# Patient Record
Sex: FEMALE | URBAN - METROPOLITAN AREA
[De-identification: names, ages, dates, MRNs, and addresses within clinical notes are randomized per-mention and may not be internally consistent; named-entity substitution may affect disease eponyms.]

---

## 2021-01-26 ENCOUNTER — IMMUNIZATIONS (OUTPATIENT)
Dept: FAMILY MEDICINE CLINIC | Facility: HOSPITAL | Age: 76
End: 2021-01-26

## 2021-01-26 DIAGNOSIS — Z23 ENCOUNTER FOR IMMUNIZATION: Primary | ICD-10-CM

## 2021-01-26 PROCEDURE — 0011A SARS-COV-2 / COVID-19 MRNA VACCINE (MODERNA) 100 MCG: CPT

## 2021-01-26 PROCEDURE — 91301 SARS-COV-2 / COVID-19 MRNA VACCINE (MODERNA) 100 MCG: CPT

## 2021-02-23 ENCOUNTER — IMMUNIZATIONS (OUTPATIENT)
Dept: FAMILY MEDICINE CLINIC | Facility: HOSPITAL | Age: 76
End: 2021-02-23

## 2021-02-23 DIAGNOSIS — Z23 ENCOUNTER FOR IMMUNIZATION: Primary | ICD-10-CM

## 2021-02-23 PROCEDURE — 91301 SARS-COV-2 / COVID-19 MRNA VACCINE (MODERNA) 100 MCG: CPT

## 2021-02-23 PROCEDURE — 0012A SARS-COV-2 / COVID-19 MRNA VACCINE (MODERNA) 100 MCG: CPT

## 2025-01-09 ENCOUNTER — APPOINTMENT (EMERGENCY)
Dept: RADIOLOGY | Facility: HOSPITAL | Age: 80
End: 2025-01-09

## 2025-01-09 ENCOUNTER — HOSPITAL ENCOUNTER (OUTPATIENT)
Facility: HOSPITAL | Age: 80
Setting detail: OBSERVATION
Discharge: HOME | End: 2025-01-10
Attending: EMERGENCY MEDICINE

## 2025-01-09 ENCOUNTER — APPOINTMENT (EMERGENCY)
Dept: CT IMAGING | Facility: HOSPITAL | Age: 80
End: 2025-01-09

## 2025-01-09 DIAGNOSIS — J96.01 ACUTE HYPOXEMIC RESPIRATORY FAILURE (HCC): ICD-10-CM

## 2025-01-09 DIAGNOSIS — I26.94 MULTIPLE SUBSEGMENTAL PULMONARY EMBOLI WITHOUT ACUTE COR PULMONALE (HCC): Primary | ICD-10-CM

## 2025-01-09 PROBLEM — E03.9 ACQUIRED HYPOTHYROIDISM: Status: ACTIVE | Noted: 2025-01-09

## 2025-01-09 PROBLEM — I10 PRIMARY HYPERTENSION: Status: ACTIVE | Noted: 2025-01-09

## 2025-01-09 LAB
ALBUMIN SERPL BCG-MCNC: 4.4 G/DL (ref 3.5–5)
ALP SERPL-CCNC: 70 U/L (ref 34–104)
ALT SERPL W P-5'-P-CCNC: 25 U/L (ref 7–52)
ANION GAP SERPL CALCULATED.3IONS-SCNC: 9 MMOL/L (ref 4–13)
APTT PPP: 28 SECONDS (ref 23–34)
AST SERPL W P-5'-P-CCNC: 21 U/L (ref 13–39)
ATRIAL RATE: 77 BPM
BASOPHILS # BLD AUTO: 0.06 THOUSANDS/ΜL (ref 0–0.1)
BASOPHILS NFR BLD AUTO: 1 % (ref 0–1)
BILIRUB SERPL-MCNC: 0.5 MG/DL (ref 0.2–1)
BNP SERPL-MCNC: 128 PG/ML (ref 0–100)
BUN SERPL-MCNC: 31 MG/DL (ref 5–25)
CALCIUM SERPL-MCNC: 12.8 MG/DL (ref 8.4–10.2)
CARDIAC TROPONIN I PNL SERPL HS: 4 NG/L (ref ?–50)
CHLORIDE SERPL-SCNC: 102 MMOL/L (ref 96–108)
CO2 SERPL-SCNC: 29 MMOL/L (ref 21–32)
CREAT SERPL-MCNC: 1.2 MG/DL (ref 0.6–1.3)
EOSINOPHIL # BLD AUTO: 0.08 THOUSAND/ΜL (ref 0–0.61)
EOSINOPHIL NFR BLD AUTO: 1 % (ref 0–6)
ERYTHROCYTE [DISTWIDTH] IN BLOOD BY AUTOMATED COUNT: 13.9 % (ref 11.6–15.1)
FLUAV RNA RESP QL NAA+PROBE: NEGATIVE
FLUBV RNA RESP QL NAA+PROBE: NEGATIVE
GFR SERPL CREATININE-BSD FRML MDRD: 42 ML/MIN/1.73SQ M
GLUCOSE SERPL-MCNC: 170 MG/DL (ref 65–140)
HCT VFR BLD AUTO: 42.5 % (ref 34.8–46.1)
HGB BLD-MCNC: 13.3 G/DL (ref 11.5–15.4)
IMM GRANULOCYTES # BLD AUTO: 0.22 THOUSAND/UL (ref 0–0.2)
IMM GRANULOCYTES NFR BLD AUTO: 2 % (ref 0–2)
INR PPP: 1.13 (ref 0.85–1.19)
LYMPHOCYTES # BLD AUTO: 2.45 THOUSANDS/ΜL (ref 0.6–4.47)
LYMPHOCYTES NFR BLD AUTO: 24 % (ref 14–44)
MCH RBC QN AUTO: 31.3 PG (ref 26.8–34.3)
MCHC RBC AUTO-ENTMCNC: 31.3 G/DL (ref 31.4–37.4)
MCV RBC AUTO: 100 FL (ref 82–98)
MONOCYTES # BLD AUTO: 0.6 THOUSAND/ΜL (ref 0.17–1.22)
MONOCYTES NFR BLD AUTO: 6 % (ref 4–12)
NEUTROPHILS # BLD AUTO: 6.65 THOUSANDS/ΜL (ref 1.85–7.62)
NEUTS SEG NFR BLD AUTO: 66 % (ref 43–75)
NRBC BLD AUTO-RTO: 0 /100 WBCS
P AXIS: 46 DEGREES
PLATELET # BLD AUTO: 208 THOUSANDS/UL (ref 149–390)
PMV BLD AUTO: 9.1 FL (ref 8.9–12.7)
POTASSIUM SERPL-SCNC: 3.5 MMOL/L (ref 3.5–5.3)
PR INTERVAL: 180 MS
PROCALCITONIN SERPL-MCNC: <0.05 NG/ML
PROT SERPL-MCNC: 8 G/DL (ref 6.4–8.4)
PROTHROMBIN TIME: 14.9 SECONDS (ref 12.3–15)
QRS AXIS: 13 DEGREES
QRSD INTERVAL: 100 MS
QT INTERVAL: 396 MS
QTC INTERVAL: 448 MS
RBC # BLD AUTO: 4.25 MILLION/UL (ref 3.81–5.12)
RSV RNA RESP QL NAA+PROBE: NEGATIVE
SARS-COV-2 RNA RESP QL NAA+PROBE: NEGATIVE
SODIUM SERPL-SCNC: 140 MMOL/L (ref 135–147)
T WAVE AXIS: 27 DEGREES
VENTRICULAR RATE: 77 BPM
WBC # BLD AUTO: 10.06 THOUSAND/UL (ref 4.31–10.16)

## 2025-01-09 PROCEDURE — 85730 THROMBOPLASTIN TIME PARTIAL: CPT

## 2025-01-09 PROCEDURE — 99291 CRITICAL CARE FIRST HOUR: CPT | Performed by: PHYSICIAN ASSISTANT

## 2025-01-09 PROCEDURE — 85730 THROMBOPLASTIN TIME PARTIAL: CPT | Performed by: PHYSICIAN ASSISTANT

## 2025-01-09 PROCEDURE — 71275 CT ANGIOGRAPHY CHEST: CPT

## 2025-01-09 PROCEDURE — 36415 COLL VENOUS BLD VENIPUNCTURE: CPT | Performed by: PHYSICIAN ASSISTANT

## 2025-01-09 PROCEDURE — 99285 EMERGENCY DEPT VISIT HI MDM: CPT

## 2025-01-09 PROCEDURE — 85025 COMPLETE CBC W/AUTO DIFF WBC: CPT | Performed by: PHYSICIAN ASSISTANT

## 2025-01-09 PROCEDURE — 84145 PROCALCITONIN (PCT): CPT | Performed by: PHYSICIAN ASSISTANT

## 2025-01-09 PROCEDURE — 84484 ASSAY OF TROPONIN QUANT: CPT | Performed by: PHYSICIAN ASSISTANT

## 2025-01-09 PROCEDURE — 80053 COMPREHEN METABOLIC PANEL: CPT | Performed by: PHYSICIAN ASSISTANT

## 2025-01-09 PROCEDURE — 71046 X-RAY EXAM CHEST 2 VIEWS: CPT

## 2025-01-09 PROCEDURE — 0241U HB NFCT DS VIR RESP RNA 4 TRGT: CPT | Performed by: PHYSICIAN ASSISTANT

## 2025-01-09 PROCEDURE — 99223 1ST HOSP IP/OBS HIGH 75: CPT

## 2025-01-09 PROCEDURE — 83880 ASSAY OF NATRIURETIC PEPTIDE: CPT | Performed by: PHYSICIAN ASSISTANT

## 2025-01-09 PROCEDURE — 93005 ELECTROCARDIOGRAM TRACING: CPT

## 2025-01-09 PROCEDURE — 85610 PROTHROMBIN TIME: CPT | Performed by: PHYSICIAN ASSISTANT

## 2025-01-09 RX ORDER — LEVOTHYROXINE SODIUM 75 UG/1
75 TABLET ORAL
COMMUNITY

## 2025-01-09 RX ORDER — HEPARIN SODIUM 1000 [USP'U]/ML
5600 INJECTION, SOLUTION INTRAVENOUS; SUBCUTANEOUS EVERY 6 HOURS PRN
Status: DISCONTINUED | OUTPATIENT
Start: 2025-01-09 | End: 2025-01-10

## 2025-01-09 RX ORDER — HEPARIN SODIUM 1000 [USP'U]/ML
2800 INJECTION, SOLUTION INTRAVENOUS; SUBCUTANEOUS EVERY 6 HOURS PRN
Status: DISCONTINUED | OUTPATIENT
Start: 2025-01-09 | End: 2025-01-10

## 2025-01-09 RX ORDER — IRBESARTAN AND HYDROCHLOROTHIAZIDE 150; 12.5 MG/1; MG/1
1 TABLET, FILM COATED ORAL EVERY EVENING
COMMUNITY

## 2025-01-09 RX ORDER — HEPARIN SODIUM 1000 [USP'U]/ML
5600 INJECTION, SOLUTION INTRAVENOUS; SUBCUTANEOUS ONCE
Status: COMPLETED | OUTPATIENT
Start: 2025-01-09 | End: 2025-01-09

## 2025-01-09 RX ORDER — HEPARIN SODIUM 10000 [USP'U]/100ML
3-30 INJECTION, SOLUTION INTRAVENOUS
Status: DISCONTINUED | OUTPATIENT
Start: 2025-01-09 | End: 2025-01-10

## 2025-01-09 RX ORDER — IPRATROPIUM BROMIDE AND ALBUTEROL SULFATE 2.5; .5 MG/3ML; MG/3ML
3 SOLUTION RESPIRATORY (INHALATION)
Status: DISCONTINUED | OUTPATIENT
Start: 2025-01-09 | End: 2025-01-09

## 2025-01-09 RX ADMIN — IOHEXOL 100 ML: 350 INJECTION, SOLUTION INTRAVENOUS at 15:35

## 2025-01-09 RX ADMIN — IPRATROPIUM BROMIDE AND ALBUTEROL SULFATE 3 ML: .5; 3 SOLUTION RESPIRATORY (INHALATION) at 20:03

## 2025-01-09 RX ADMIN — IPRATROPIUM BROMIDE AND ALBUTEROL SULFATE 3 ML: .5; 3 SOLUTION RESPIRATORY (INHALATION) at 14:41

## 2025-01-09 RX ADMIN — HEPARIN SODIUM 5600 UNITS: 1000 INJECTION INTRAVENOUS; SUBCUTANEOUS at 16:29

## 2025-01-09 RX ADMIN — HEPARIN SODIUM 18 UNITS/KG/HR: 10000 INJECTION, SOLUTION INTRAVENOUS at 16:31

## 2025-01-09 NOTE — ED PROVIDER NOTES
Time reflects when diagnosis was documented in both MDM as applicable and the Disposition within this note       Time User Action Codes Description Comment    1/9/2025  4:23 PM Km Dan Add [I26.94] Multiple subsegmental pulmonary emboli without acute cor pulmonale (HCC)     1/9/2025  4:23 PM Km Dan Add [J96.01] Acute hypoxemic respiratory failure (HCC)           ED Disposition       ED Disposition   Admit    Condition   Stable    Date/Time   Thu Jan 9, 2025  4:23 PM    Comment   Case was discussed with Dr. Linda and the patient's admission status was agreed to be Admission Status: observation status to the service of Dr. Linda.               Assessment & Plan       Medical Decision Making  79-year-old female presenting to the emergency department today for hypoxia.  Recently had nasal congestion, rhinorrhea, and a cough.  Patient denies any chest pain or shortness of breath but does have pain to her back when she coughs.  Vital signs show hypoxemia.  The patient is requiring 2 L nasal cannula to maintain oxygen greater than 92%.  Lungs clear to auscultation bilaterally without adventitious breath sounds.  No lower extremity swelling.  She had a negative troponin x 1.  BNP is slightly elevated.  She has no leukocytosis.  She had a negative viral panel.  Chest x-ray shows no acute cardiopulmonary disease on my independent interpretation.  I performed a CTA PE study secondary to undetermined hypoxemia; it appears she has multiple pulmonary emboli without evidence of right heart strain.  RV/LV ratio within normal limits.  The patient was initiated on a heparin infusion.  Case was discussed with Dr. Linda who accepts the patient for observation.  The patient and/or patient's proxy verify their understanding and agree to the plan at this time.  All questions answered to the patient and/or their proxy's satisfaction.  All labs reviewed and utilized in the medical decision  "making process (if labs were ordered).  Portions of the record may have been created with voice recognition software.  Occasional wrong word or \"sound a like\" substitutions may have occurred due to the inherent limitations of voice recognition software.  Read the chart carefully and recognize, using context, where substitutions have occurred.    Case discussed with daughter at bedside.    Problems Addressed:  Acute hypoxemic respiratory failure (HCC): undiagnosed new problem with uncertain prognosis  Multiple subsegmental pulmonary emboli without acute cor pulmonale (HCC): undiagnosed new problem with uncertain prognosis    Amount and/or Complexity of Data Reviewed  Independent Historian: caregiver     Details: Daughter  Labs: ordered. Decision-making details documented in ED Course.  Radiology: ordered and independent interpretation performed. Decision-making details documented in ED Course.     Details: CXR  ECG/medicine tests: ordered and independent interpretation performed. Decision-making details documented in ED Course.  Discussion of management or test interpretation with external provider(s): Dr. Alexei Dalton  Prescription drug management.  Decision regarding hospitalization.             Medications   ipratropium-albuterol (DUO-NEB) 0.5-2.5 mg/3 mL inhalation solution 3 mL (3 mL Nebulization Given 1/9/25 1441)   heparin (porcine) 25,000 units in 0.45% NaCl 250 mL infusion (premix) (18 Units/kg/hr × 70 kg (Order-Specific) Intravenous New Bag 1/9/25 1631)   heparin (porcine) injection 5,600 Units (has no administration in time range)   heparin (porcine) injection 2,800 Units (has no administration in time range)   iohexol (OMNIPAQUE) 350 MG/ML injection (SINGLE-DOSE) 100 mL (100 mL Intravenous Given 1/9/25 1535)   heparin (porcine) injection 5,600 Units (5,600 Units Intravenous Given 1/9/25 1629)       ED Risk Strat Scores                          SBIRT 20yo+      Flowsheet Row Most Recent Value "   Initial Alcohol Screen: US AUDIT-C     1. How often do you have a drink containing alcohol? 0 Filed at: 01/09/2025 1313   2. How many drinks containing alcohol do you have on a typical day you are drinking?  0 Filed at: 01/09/2025 1313   3a. Male UNDER 65: How often do you have five or more drinks on one occasion? 0 Filed at: 01/09/2025 1313   3b. FEMALE Any Age, or MALE 65+: How often do you have 4 or more drinks on one occassion? 0 Filed at: 01/09/2025 1313   Audit-C Score 0 Filed at: 01/09/2025 1313   ZENAIDA: How many times in the past year have you...    Used an illegal drug or used a prescription medication for non-medical reasons? Never Filed at: 01/09/2025 1313                            History of Present Illness       Chief Complaint   Patient presents with    Decreased Oxygen Level     Pt sent in by primary care, decreased O2 levels in office 88-89%. Flu like symptoms over the past five days. 88% in triage, endorses back pain, denies chest pain       Past Medical History:   Diagnosis Date    Disease of thyroid gland     Hypertension       History reviewed. No pertinent surgical history.   History reviewed. No pertinent family history.   Social History     Tobacco Use    Smoking status: Never    Smokeless tobacco: Never      E-Cigarette/Vaping      E-Cigarette/Vaping Substances      I have reviewed and agree with the history as documented.     This is an 79-year-old female with past medical history significant for hypertension presenting to the emergency department today for a cough associated with nasal congestion and rhinorrhea for approximately 5 days.  The patient went to her PCP earlier today who noted that she has low oxygen levels and sent her to the emergency department for an evaluation.  The patient denies any associated chest pain or shortness of breath but she does note chest wall pain specifically when she coughs.  She denies any history of pulmonary emboli or venous thromboemboli.  She denies  any fevers or chills.  She has no lower extremity swelling or calf pain.  She has no nausea or vomiting.  She has no diarrhea or constipation.  She denies any dizziness, lightheadedness, or visual disturbances.  The patient's cough is nonproductive.  The patient denies other complaints at this time.      History provided by:  Patient   used: No    Cough  Cough characteristics:  Non-productive  Severity:  Moderate  Onset quality:  Gradual  Duration:  5 days  Timing:  Intermittent  Progression:  Waxing and waning  Chronicity:  New  Relieved by:  None tried  Worsened by:  Nothing  Ineffective treatments:  None tried  Associated symptoms: rhinorrhea and sinus congestion    Associated symptoms: no chest pain, no chills, no diaphoresis, no ear fullness, no ear pain, no eye discharge, no fever, no headaches, no myalgias, no rash, no shortness of breath, no sore throat, no weight loss and no wheezing        Review of Systems   Constitutional:  Negative for appetite change, chills, diaphoresis, fatigue, fever and weight loss.   HENT:  Positive for congestion and rhinorrhea. Negative for ear pain, sinus pressure, sinus pain and sore throat.    Eyes:  Negative for discharge.   Respiratory:  Positive for cough. Negative for chest tightness, shortness of breath and wheezing.    Cardiovascular:  Negative for chest pain, palpitations and leg swelling.   Gastrointestinal:  Negative for abdominal pain, constipation, diarrhea, nausea and vomiting.   Musculoskeletal:  Negative for myalgias, neck pain and neck stiffness.   Skin:  Negative for rash and wound.   Neurological:  Negative for dizziness, seizures, syncope, weakness, light-headedness, numbness and headaches.   Psychiatric/Behavioral:  Negative for confusion.    All other systems reviewed and are negative.          Objective       ED Triage Vitals [01/09/25 1311]   Temperature Pulse Blood Pressure Respirations SpO2 Patient Position - Orthostatic VS   98.4  °F (36.9 °C) 87 130/60 20 (S) (!) 88 % Sitting      Temp Source Heart Rate Source BP Location FiO2 (%) Pain Score    Oral Monitor Left arm -- --      Vitals      Date and Time Temp Pulse SpO2 Resp BP Pain Score FACES Pain Rating User   01/09/25 1815 -- 68 94 % 18 -- -- -- DU   01/09/25 1730 -- 70 91 % 21 -- -- -- DU   01/09/25 1500 -- 69 99 % 18 -- -- -- DU   01/09/25 1315 -- 83 90 % 19 130/60 -- -- DU   01/09/25 1311 98.4 °F (36.9 °C) 87  88 % 20 130/60 -- -- CB            Physical Exam  Vitals and nursing note reviewed.   Constitutional:       General: She is not in acute distress.     Appearance: Normal appearance. She is normal weight. She is not ill-appearing, toxic-appearing or diaphoretic.   HENT:      Head: Normocephalic and atraumatic.      Nose: Nose normal. No congestion or rhinorrhea.      Mouth/Throat:      Mouth: Mucous membranes are moist.      Pharynx: No oropharyngeal exudate or posterior oropharyngeal erythema.   Eyes:      General: No scleral icterus.        Right eye: No discharge.         Left eye: No discharge.      Extraocular Movements: Extraocular movements intact.      Pupils: Pupils are equal, round, and reactive to light.   Cardiovascular:      Rate and Rhythm: Normal rate and regular rhythm.      Pulses: Normal pulses.      Heart sounds: Normal heart sounds. No murmur heard.     No friction rub. No gallop.   Pulmonary:      Effort: Pulmonary effort is normal. No respiratory distress.      Breath sounds: Normal breath sounds. No stridor. No wheezing, rhonchi or rales.      Comments: Lungs are clear to auscultation bilaterally without adventitious breath sounds.  Patient is saturating between 86 and 88% on room air.  Placed on 2 L nasal cannula and is now saturating greater than 92%.  Chest:      Chest wall: No tenderness.   Abdominal:      General: Abdomen is flat. There is no distension.      Palpations: Abdomen is soft.      Tenderness: There is no abdominal tenderness. There is no  right CVA tenderness, left CVA tenderness, guarding or rebound.   Musculoskeletal:         General: Normal range of motion.      Cervical back: Normal range of motion. No tenderness.      Right lower leg: No edema.      Left lower leg: No edema.      Comments: Negative Homans' sign bilaterally   Skin:     General: Skin is warm and dry.      Capillary Refill: Capillary refill takes less than 2 seconds.      Coloration: Skin is not jaundiced or pale.   Neurological:      General: No focal deficit present.      Mental Status: She is alert and oriented to person, place, and time. Mental status is at baseline.   Psychiatric:         Mood and Affect: Mood normal.         Behavior: Behavior normal.         Results Reviewed       Procedure Component Value Units Date/Time    Protime-INR [[id number]]  (Normal) Collected: 01/09/25 1605    Lab Status: Final result Specimen: Blood from Arm, Left Updated: 01/09/25 1617     Protime 14.9 seconds      INR 1.13    Narrative:      INR Therapeutic Range    Indication                                             INR Range      Atrial Fibrillation                                               2.0-3.0  Hypercoagulable State                                    2.0.2.3  Left Ventricular Asist Device                            2.0-3.0  Mechanical Heart Valve                                  -    Aortic(with afib, MI, embolism, HF, LA enlargement,    and/or coagulopathy)                                     2.0-3.0 (2.5-3.5)     Mitral                                                             2.5-3.5  Prosthetic/Bioprosthetic Heart Valve               2.0-3.0  Venous thromboembolism (VTE: VT, PE        2.0-3.0    APTT [[id number]]  (Normal) Collected: 01/09/25 1605    Lab Status: Final result Specimen: Blood from Arm, Left Updated: 01/09/25 1617     PTT 28 seconds     FLU/RSV/COVID - if FLU/RSV clinically relevant (2hr TAT) [[id number]]  (Normal) Collected: 01/09/25 1322    Lab Status: Final  result Specimen: Nares from Nose Updated: 01/09/25 1421     SARS-CoV-2 Negative     INFLUENZA A PCR Negative     INFLUENZA B PCR Negative     RSV PCR Negative    Narrative:      This test has been performed using the CoV-2/Flu/RSV plus assay on the "[employer] platform. This test has been validated by the  and verified by the performing laboratory.     This test is designed to amplify and detect the following: nucleocapsid (N), envelope (E), and RNA-dependent RNA polymerase (RdRP) genes of the SARS-CoV-2 genome; matrix (M), basic polymerase (PB2), and acidic protein (PA) segments of the influenza A genome; matrix (M) and non-structural protein (NS) segments of the influenza B genome, and the nucleocapsid genes of RSV A and RSV B.     Positive results are indicative of the presence of Flu A, Flu B, RSV, and/or SARS-CoV-2 RNA. Positive results for SARS-CoV-2 or suspected novel influenza should be reported to state, local, or federal health departments according to local reporting requirements.      All results should be assessed in conjunction with clinical presentation and other laboratory markers for clinical management.     FOR PEDIATRIC PATIENTS - copy/paste COVID Guidelines URL to browser: [email or web address]       Comprehensive metabolic panel [[id number]]  (Abnormal) Collected: 01/09/25 1322    Lab Status: Final result Specimen: Blood from Arm, Left Updated: 01/09/25 1407     Sodium 140 mmol/L      Potassium 3.5 mmol/L      Chloride 102 mmol/L      CO2 29 mmol/L      ANION GAP 9 mmol/L      BUN 31 mg/dL      Creatinine 1.20 mg/dL      Glucose 170 mg/dL      Calcium 12.8 mg/dL      AST 21 U/L      ALT 25 U/L      Alkaline Phosphatase 70 U/L      Total Protein 8.0 g/dL      Albumin 4.4 g/dL      Total Bilirubin 0.50 mg/dL      eGFR 42 ml/min/1.73sq m     Narrative:      National Kid[employer] Foundation guidelines for Chronic Kidney Disease  (CKD):     Stage 1 with normal or high GFR (GFR > 90 mL/min/1.73 square meters)    Stage 2 Mild CKD (GFR = 60-89 mL/min/1.73 square meters)    Stage 3A Moderate CKD (GFR = 45-59 mL/min/1.73 square meters)    Stage 3B Moderate CKD (GFR = 30-44 mL/min/1.73 square meters)    Stage 4 Severe CKD (GFR = 15-29 mL/min/1.73 square meters)    Stage 5 End Stage CKD (GFR <15 mL/min/1.73 square meters)  Note: GFR calculation is accurate only with a steady state creatinine    Procalcitonin [[id number]]  (Normal) Collected: 01/09/25 1322    Lab Status: Final result Specimen: Blood from Arm, Left Updated: 01/09/25 1356     Procalcitonin <0.05 ng/ml     HS Troponin 0hr (reflex protocol) [[id number]]  (Normal) Collected: 01/09/25 1322    Lab Status: Final result Specimen: Blood from Arm, Left Updated: 01/09/25 1351     hs TnI 0hr 4 ng/L     B-Type Natriuretic Peptide(BNP) [[id number]]  (Abnormal) Collected: 01/09/25 1322    Lab Status: Final result Specimen: Blood from Arm, Left Updated: 01/09/25 1351      pg/mL     CBC and differential [[id number]]  (Abnormal) Collected: 01/09/25 1322    Lab Status: Final result Specimen: Blood from Arm, Left Updated: 01/09/25 1329     WBC 10.06 Thousand/uL      RBC 4.25 Million/uL      Hemoglobin 13.3 g/dL      Hematocrit 42.5 %       fL      MCH 31.3 pg      MCHC 31.3 g/dL      RDW 13.9 %      MPV 9.1 fL      Platelets 208 Thousands/uL      nRBC 0 /100 WBCs      Segmented % 66 %      Immature Grans % 2 %      Lymphocytes % 24 %      Monocytes % 6 %      Eosinophils Relative 1 %      Basophils Relative 1 %      Absolute Neutrophils 6.65 Thousands/µL      Absolute Immature Grans 0.22 Thousand/uL      Absolute Lymphocytes 2.45 Thousands/µL      Absolute Monocytes 0.60 Thousand/µL      Eosinophils Absolute 0.08 Thousand/µL      Basophils Absolute 0.06 Thousands/µL             CTA chest pe study   Final Interpretation by Max Martínez MD (01/09 1553)      Images degraded by respiratory motion  artifact.      Acute pulmonary emboli involving predominantly the right middle and right lower lobe pulmonary artery branches, with lesser involvement also likely present of right upper lobe pulmonary artery branches. No saddle embolus is identified. No definite left    pulmonary emboli are identified.      Measured RV/LV ratio is within normal limits at less than 0.9.      Ventricles are normal in size. Likely concentric left ventricular hypertrophy. Mild left atrial enlargement.      Bibasilar lower lobe atelectasis. No consolidation pleural effusion or pneumothorax identified.         I personally discussed this study with KM GARCIA on 1/9/2025 3:49 PM.            Workstation performed: [id number]         XR chest 2 views   Final Interpretation by Andrew Bellamy MD (01/09 1453)      Hypoventilation with prominence of the interstitial markings and atelectasis at the bases. Small infiltrates are less likely.      Some minimal pulmonary vascular congestion may be present.      Follow-up may be useful.            Resident: Nico Quevedo I, the attending radiologist, have reviewed the images and agree with the final report above.      Workstation performed: [id number]             ECG 12 Lead Documentation Only    Date/Time: 1/9/2025 1:23 PM    Performed by: Km Garcia PA-C  Authorized by: Km Garcia PA-C    Indications / Diagnosis:  Hypoxia  Patient location:  ED  Previous ECG:     Previous ECG:  Unavailable  Interpretation:     Interpretation: normal    Rate:     ECG rate:  77    ECG rate assessment: normal    Rhythm:     Rhythm: sinus rhythm    Ectopy:     Ectopy: none    QRS:     QRS axis:  Normal  Conduction:     Conduction: normal    ST segments:     ST segments:  Normal  T waves:     T waves: normal    Comments:      Normal sinus rhythm with a rate of 77.  Normal axis.  No ectopy.  No STEMI.  CriticalCare Time    Date/Time: 1/9/2025 5:00  PM    Performed by: Km Dan PA-C  Authorized by: Km Dan PA-C    Critical care provider statement:     Critical care time (minutes):  32    Critical care time was exclusive of:  Separately billable procedures and treating other patients    Critical care was necessary to treat or prevent imminent or life-threatening deterioration of the following conditions:  Respiratory failure    Critical care was time spent personally by me on the following activities:  Blood draw for specimens, obtaining history from patient or surrogate, development of treatment plan with patient or surrogate, examination of patient, evaluation of patient's response to treatment, ordering and performing treatments and interventions, ordering and review of laboratory studies, ordering and review of radiographic studies, re-evaluation of patient's condition and review of old charts  Comments:      Multiple Pulmonary Emboli + IV Heparin GTT      ED Medication and Procedure Management   Prior to Admission Medications   Prescriptions Last Dose Informant Patient Reported? Taking?   irbesartan-hydrochlorothiazide (AVALIDE) 150-12.5 MG per tablet   Yes Yes   Sig: Take 1 tablet by mouth every evening   levothyroxine 75 mcg tablet   Yes Yes   Sig: Take 75 mcg by mouth daily in the early morning      Facility-Administered Medications: None     Current Discharge Medication List        START taking these medications    Details   apixaban (ELIQUIS) 5 mg Take 2 tablets (10 mg total) by mouth 2 (two) times a day for 7 days, THEN 1 tablet (5 mg total) 2 (two) times a day for 23 days.  Qty: 74 tablet, Refills: 0    Comments: Eliquis Starter Pack  Associated Diagnoses: Multiple subsegmental pulmonary emboli without acute cor pulmonale (HCC)           CONTINUE these medications which have NOT CHANGED    Details   irbesartan-hydrochlorothiazide (AVALIDE) 150-12.5 MG per tablet Take 1 tablet by mouth every evening       levothyroxine 75 mcg tablet Take 75 mcg by mouth daily in the early morning           No discharge procedures on file.  ED SEPSIS DOCUMENTATION   Time reflects when diagnosis was documented in both MDM as applicable and the Disposition within this note       Time User Action Codes Description Comment    1/9/2025  4:23 PM Km Dan Add [I26.94] Multiple subsegmental pulmonary emboli without acute cor pulmonale (HCC)     1/9/2025  4:23 PM Km Dan Add [J96.01] Acute hypoxemic respiratory failure (HCC)                  Km Dan PA-C  01/09/25 0716

## 2025-01-09 NOTE — H&P
H&P - Hospitalist   Name: Donna Amado 79 y.o. female I MRN: [id number]  Unit/Bed#: ED 05 I Date of Admission: 1/9/2025   Date of Service: 1/9/2025 I Hospital Day: 0     Assessment & Plan  Multiple subsegmental pulmonary emboli without acute cor pulmonale (HCC)  R lung pulmonary emboli found on CT PE study due to hypoxia, cough  Has a sedentary lifestyle  Has had a DVT in her LE several years ago, but not currently on any AC  Hemodynamically stable at this time.    Plan:  Continue heparin gtt for now  Eliquis has been sent for vizcaino check  Will likely be able to d/c home tomorrow as long as able to wean off oxygen    Acquired hypothyroidism  Continue home euthyrox 75mcg  Primary hypertension  Continue irbesartan/HCTZ combo pill  Acute hypoxic respiratory failure (HCC)  Was requiring 3L O2 on admission due to hypoxia to 86%.  2/2 to PE  Did have recent viral illness about a week ago    Will try to wean as able. During my visit was saturating well on 1.5L      VTE Pharmacologic Prophylaxis: VTE Score: 8 High Risk (Score >/= 5) - Pharmacological DVT Prophylaxis Ordered: heparin drip. Sequential Compression Devices Ordered.  Code Status: Full Code  Discussion with family: Updated  (daughter and son in law) at bedside.    Anticipated Length of Stay: Patient will be admitted on an observation basis with an anticipated length of stay of less than 2 midnights secondary to PE.    History of Present Illness   Chief Complaint: Cruz Amado is a 79 y.o. female with a PMH of DVT, htn, hypothyroidism who presents with cough and pleuritic back pain.   Per the daughter at bedside who aids with the history as pt does not speak english says patient had an URI a week ago, but her cough has persisted. It is associated with pleuritic back pain. She does not have any phlegm, fevers, chills. No SOB.   Patient was hypoxic in the ED, prompting further imaging, that showed pulmonary emboli in the right lung.      Review of Systems   Constitutional:  Negative for chills, fatigue and fever.   HENT:  Negative for ear pain and sore throat.    Eyes:  Negative for pain and visual disturbance.   Respiratory:  Positive for cough. Negative for shortness of breath.    Cardiovascular:  Negative for chest pain and palpitations.   Gastrointestinal:  Negative for abdominal pain and vomiting.   Musculoskeletal:  Positive for back pain. Negative for arthralgias.   Skin:  Negative for color change and rash.   All other systems reviewed and are negative.      Historical Information   Past Medical History:   Diagnosis Date    Disease of thyroid gland     Hypertension      History reviewed. No pertinent surgical history.  Social History     Tobacco Use    Smoking status: Never    Smokeless tobacco: Never   Substance and Sexual Activity    Alcohol use: Not on file    Drug use: Not on file    Sexual activity: Not on file     E-Cigarette/Vaping     E-Cigarette/Vaping Substances     History reviewed. No pertinent family history.  Social History:  Marital Status: Unknown   Patient Pre-hospital Living Situation: Home, With other family member: daughter/son in law  Patient Pre-hospital Level of Mobility: walks  Patient Pre-hospital Diet Restrictions: none    Meds/Allergies   I have reviewed home medications with patient family member.  Prior to Admission medications    Medication Sig Start Date End Date Taking? Authorizing Provider   apixaban (ELIQUIS) 5 mg Take 2 tablets (10 mg total) by mouth 2 (two) times a day for 7 days, THEN 1 tablet (5 mg total) 2 (two) times a day for 23 days. 1/9/25 2/8/25 Yes Danielle Linda,    irbesartan-hydrochlorothiazide (AVALIDE) 150-12.5 MG per tablet Take 1 tablet by mouth every evening   Yes Historical Provider, MD   levothyroxine 75 mcg tablet Take 75 mcg by mouth daily in the early morning   Yes Historical Provider, MD     No Known Allergies    Objective :  Temp:  [98.4 °F (36.9 °C)] 98.4 °F (36.9  "°C)  HR:  [69-87] 70  BP: (130)/(60) 130/60  Resp:  [18-21] 21  SpO2:  [88 %-99 %] 91 %  O2 Device: None (Room air)  Nasal Cannula O2 Flow Rate (L/min):  [2 L/min] 2 L/min    Physical Exam  Vitals and nursing note reviewed.   Constitutional:       General: She is not in acute distress.     Appearance: She is well-developed.   HENT:      Head: Normocephalic and atraumatic.   Eyes:      Conjunctiva/sclera: Conjunctivae normal.   Cardiovascular:      Rate and Rhythm: Normal rate and regular rhythm.      Heart sounds: No murmur heard.  Pulmonary:      Effort: Pulmonary effort is normal. No respiratory distress.      Breath sounds: Normal breath sounds. No wheezing.   Abdominal:      Palpations: Abdomen is soft.      Tenderness: There is no abdominal tenderness.   Musculoskeletal:         General: No swelling.      Cervical back: Neck supple.      Right lower leg: No edema.      Left lower leg: No edema.   Skin:     General: Skin is warm and dry.   Neurological:      Mental Status: She is alert and oriented to person, place, and time.   Psychiatric:         Mood and Affect: Mood normal.          Lines/Drains:            Lab Results: I have reviewed the following results:  Results from last 7 days   Lab Units 01/09/25  1322   WBC Thousand/uL 10.06   HEMOGLOBIN g/dL 13.3   HEMATOCRIT % 42.5   PLATELETS Thousands/uL 208   SEGS PCT % 66   LYMPHO PCT % 24   MONO PCT % 6   EOS PCT % 1     Results from last 7 days   Lab Units 01/09/25  1322   SODIUM mmol/L 140   POTASSIUM mmol/L 3.5   CHLORIDE mmol/L 102   CO2 mmol/L 29   BUN mg/dL 31*   CREATININE mg/dL 1.20   ANION GAP mmol/L 9   CALCIUM mg/dL 12.8*   ALBUMIN g/dL 4.4   TOTAL BILIRUBIN mg/dL 0.50   ALK PHOS U/L 70   ALT U/L 25   AST U/L 21   GLUCOSE RANDOM mg/dL 170*     Results from last 7 days   Lab Units 01/09/25  1605   INR  1.13         No results found for: \"HGBA1C\"  Results from last 7 days   Lab Units 01/09/25  1322   PROCALCITONIN ng/ml <0.05       Imaging Results " Review: I reviewed radiology reports from this admission including: CT chest.  Other Study Results Review: EKG was reviewed.     Administrative Statements       ** Please Note: This note has been constructed using a voice recognition system. **

## 2025-01-09 NOTE — Clinical Note
Case was discussed with Dr. Linda and the patient's admission status was agreed to be Admission Status: observation status to the service of Dr. Linda.

## 2025-01-09 NOTE — ED NOTES
Patient spO2 high 80's on room air, patient placed on 2L NC, provider aware.      Wanda Batista RN  01/09/25 [id number]

## 2025-01-10 VITALS
OXYGEN SATURATION: 93 % | HEIGHT: 57 IN | HEART RATE: 58 BPM | TEMPERATURE: 98.4 F | DIASTOLIC BLOOD PRESSURE: 72 MMHG | RESPIRATION RATE: 15 BRPM | BODY MASS INDEX: 34.44 KG/M2 | SYSTOLIC BLOOD PRESSURE: 168 MMHG | WEIGHT: 159.61 LBS

## 2025-01-10 LAB
ANION GAP SERPL CALCULATED.3IONS-SCNC: 6 MMOL/L (ref 4–13)
APTT PPP: 173 SECONDS (ref 23–34)
APTT PPP: >210 SECONDS (ref 23–34)
BUN SERPL-MCNC: 28 MG/DL (ref 5–25)
CALCIUM SERPL-MCNC: 11.8 MG/DL (ref 8.4–10.2)
CHLORIDE SERPL-SCNC: 107 MMOL/L (ref 96–108)
CO2 SERPL-SCNC: 27 MMOL/L (ref 21–32)
CREAT SERPL-MCNC: 1.06 MG/DL (ref 0.6–1.3)
DME PARACHUTE DELIVERY DATE ACTUAL: NORMAL
DME PARACHUTE DELIVERY DATE EXPECTED: NORMAL
DME PARACHUTE DELIVERY DATE REQUESTED: NORMAL
DME PARACHUTE ITEM DESCRIPTION: NORMAL
DME PARACHUTE ORDER STATUS: NORMAL
DME PARACHUTE SUPPLIER NAME: NORMAL
DME PARACHUTE SUPPLIER PHONE: NORMAL
ERYTHROCYTE [DISTWIDTH] IN BLOOD BY AUTOMATED COUNT: 14.2 % (ref 11.6–15.1)
GFR SERPL CREATININE-BSD FRML MDRD: 49 ML/MIN/1.73SQ M
GLUCOSE SERPL-MCNC: 100 MG/DL (ref 65–140)
HCT VFR BLD AUTO: 34.9 % (ref 34.8–46.1)
HGB BLD-MCNC: 11.2 G/DL (ref 11.5–15.4)
MAGNESIUM SERPL-MCNC: 1.6 MG/DL (ref 1.9–2.7)
MCH RBC QN AUTO: 31.8 PG (ref 26.8–34.3)
MCHC RBC AUTO-ENTMCNC: 32.1 G/DL (ref 31.4–37.4)
MCV RBC AUTO: 99 FL (ref 82–98)
PLATELET # BLD AUTO: 195 THOUSANDS/UL (ref 149–390)
PMV BLD AUTO: 8.9 FL (ref 8.9–12.7)
POTASSIUM SERPL-SCNC: 3.7 MMOL/L (ref 3.5–5.3)
RBC # BLD AUTO: 3.52 MILLION/UL (ref 3.81–5.12)
SODIUM SERPL-SCNC: 140 MMOL/L (ref 135–147)
WBC # BLD AUTO: 8.75 THOUSAND/UL (ref 4.31–10.16)

## 2025-01-10 PROCEDURE — 83735 ASSAY OF MAGNESIUM: CPT

## 2025-01-10 PROCEDURE — 36415 COLL VENOUS BLD VENIPUNCTURE: CPT

## 2025-01-10 PROCEDURE — 99239 HOSP IP/OBS DSCHRG MGMT >30: CPT

## 2025-01-10 PROCEDURE — 85730 THROMBOPLASTIN TIME PARTIAL: CPT

## 2025-01-10 PROCEDURE — 94761 N-INVAS EAR/PLS OXIMETRY MLT: CPT

## 2025-01-10 PROCEDURE — 85027 COMPLETE CBC AUTOMATED: CPT

## 2025-01-10 PROCEDURE — 80048 BASIC METABOLIC PNL TOTAL CA: CPT

## 2025-01-10 RX ORDER — LOSARTAN POTASSIUM 50 MG/1
50 TABLET ORAL DAILY
Status: DISCONTINUED | OUTPATIENT
Start: 2025-01-10 | End: 2025-01-10 | Stop reason: HOSPADM

## 2025-01-10 RX ORDER — LEVOTHYROXINE SODIUM 25 UG/1
75 TABLET ORAL
Status: DISCONTINUED | OUTPATIENT
Start: 2025-01-10 | End: 2025-01-10 | Stop reason: HOSPADM

## 2025-01-10 RX ORDER — ENOXAPARIN SODIUM 100 MG/ML
60 INJECTION SUBCUTANEOUS EVERY 12 HOURS
Qty: 36 ML | Refills: 1 | Status: SHIPPED | OUTPATIENT
Start: 2025-01-10

## 2025-01-10 RX ORDER — MAGNESIUM SULFATE HEPTAHYDRATE 40 MG/ML
2 INJECTION, SOLUTION INTRAVENOUS ONCE
Status: COMPLETED | OUTPATIENT
Start: 2025-01-10 | End: 2025-01-10

## 2025-01-10 RX ORDER — ACETAMINOPHEN 325 MG/1
650 TABLET ORAL EVERY 6 HOURS PRN
Status: DISCONTINUED | OUTPATIENT
Start: 2025-01-10 | End: 2025-01-10 | Stop reason: HOSPADM

## 2025-01-10 RX ORDER — BENZONATATE 100 MG/1
200 CAPSULE ORAL ONCE
Status: COMPLETED | OUTPATIENT
Start: 2025-01-10 | End: 2025-01-10

## 2025-01-10 RX ORDER — HYDROCHLOROTHIAZIDE 12.5 MG/1
12.5 TABLET ORAL DAILY
Status: DISCONTINUED | OUTPATIENT
Start: 2025-01-10 | End: 2025-01-10 | Stop reason: HOSPADM

## 2025-01-10 RX ADMIN — HYDROCHLOROTHIAZIDE 12.5 MG: 12.5 TABLET ORAL at 08:41

## 2025-01-10 RX ADMIN — APIXABAN 10 MG: 5 TABLET, FILM COATED ORAL at 12:47

## 2025-01-10 RX ADMIN — ACETAMINOPHEN 650 MG: 325 TABLET, FILM COATED ORAL at 12:47

## 2025-01-10 RX ADMIN — LEVOTHYROXINE SODIUM 75 MCG: 25 TABLET ORAL at 05:56

## 2025-01-10 RX ADMIN — LOSARTAN POTASSIUM 50 MG: 50 TABLET, FILM COATED ORAL at 08:41

## 2025-01-10 RX ADMIN — BENZONATATE 200 MG: 100 CAPSULE ORAL at 12:47

## 2025-01-10 RX ADMIN — MAGNESIUM SULFATE HEPTAHYDRATE 2 G: 40 INJECTION, SOLUTION INTRAVENOUS at 08:02

## 2025-01-10 NOTE — ED NOTES
Report received from previous shift. Pt is resting, the family is at the bedside.     Katelyn Castorena RN  01/09/25 0320

## 2025-01-10 NOTE — ASSESSMENT & PLAN NOTE
Was requiring 3L O2 on admission due to hypoxia to 86%.  2/2 to PE  Did have recent viral illness about a week ago  Per RT eval, 1 L at rest, 2.5 with exertion

## 2025-01-10 NOTE — ED NOTES
Patient ambulated to restroom with family at this time, steady gait noted.      Wanda Batista RN  01/10/25 1011

## 2025-01-10 NOTE — CASE MANAGEMENT
Case Management Discharge Planning Note    Patient name Donna Amado  Location ED 05/ED 05 MRN [id number]  : 1945 Date 1/10/2025       Current Admission Date: 2025  Current Admission Diagnosis:Multiple subsegmental pulmonary emboli without acute cor pulmonale (HCC)   Patient Active Problem List    Diagnosis Date Noted Date Diagnosed    Multiple subsegmental pulmonary emboli without acute cor pulmonale (HCC) 2025     Acquired hypothyroidism 2025     Primary hypertension 2025     Acute hypoxic respiratory failure (HCC) 2025       LOS (days): 0  Geometric Mean LOS (GMLOS) (days):   Days to GMLOS:     OBJECTIVE:     Current admission status: Observation   Preferred Pharmacy:   [id number]  [place] - [address]  [address] [place] [address]  Phone: [phone] Fax: [phone]    Primary Care Provider: No primary care provider on file.    Primary Insurance:   Secondary Insurance:     DISCHARGE DETAILS:    DME Referral Provided  Referral made for DME?: Yes  DME referral completed for the following items:: Home Oxygen concentrator  DME Supplier Name:: [facility]    Treatment Team Recommendation: Home  Discharge Destination Plan:: Home  Transport at Discharge : Family    Per  leadership, hospital able to cover 30 days supply for O2 as patient is currently uninsured. O2 ordered through [employer], financial assistance form provided to company.  delivered O2 tank bedside to the patient/her daughter. Daughter is aware of 30 days coverage. Daughter reports speaking with [employer] already and reports being active in getting the patient insured. Referral to be made to [facility] for PCP.

## 2025-01-10 NOTE — UTILIZATION REVIEW
Initial Clinical Review    Admission: Date/Time/Statement:   Admission Orders (From admission, onward)       Ordered        01/09/25 1623  Place in Observation  Once                          Orders Placed This Encounter   Procedures    Place in Observation     Standing Status:   Standing     Number of Occurrences:   1     Level of Care:   Med Surg [16]     ED Arrival Information       Expected   -    Arrival   1/9/2025 13:00    Acuity   Urgent              Means of arrival   Walk-In    Escorted by   Family Member    Service   Hospitalist    Admission type   Emergency              Arrival complaint   cough, low oxygen, sent by              Chief Complaint   Patient presents with    Decreased Oxygen Level     Pt sent in by primary care, decreased O2 levels in office 88-89%. Flu like symptoms over the past five days. 88% in triage, endorses back pain, denies chest pain       Initial Presentation: 79 y.o. female who presented w/ family to [facility] ED. Admitted as Observation for evaluation and treatment of Multiple subsegmental PE. Acute hypoxic respiratory failure.      PMHx:  has a past medical history of Disease of thyroid gland and Hypertension.      Presented w/ cough and pleuritic back pain. Pt sent in by PCP, decrease O2 in office 88-89%, flu like symptoms past 5 days.  In ED, pt found to be hypoxic 86% -88% RA, placed on 2L NC @ 92%. Negative susie's sign bilaterally.  Abnormal Labs Bun 31, Ca 12.8, . CTA: Acute pulmonary emboli involving predominantly the right middle and right lower lobe pulmonary artery branches, with lesser involvement also likely present of right upper lobe pulmonary artery branches. No saddle embolus is identified. See below med list for meds given in ED.    Plan: Continue Heparin gtt, Price check Eliquis, Wean O2 as tolerated.       Date: 1/10/25   Day 2: DISCHARGE HOME ON THIS DATE.      ED Treatment-Medication Administration from 01/09/2025 1300 to 01/10/2025 1101          Date/Time Order Dose Route Action     01/09/2025 1441 ipratropium-albuterol (DUO-NEB) 0.5-2.5 mg/3 mL inhalation solution 3 mL 3 mL Nebulization Given     01/09/2025 2003 ipratropium-albuterol (DUO-NEB) 0.5-2.5 mg/3 mL inhalation solution 3 mL 3 mL Nebulization Given     01/09/2025 1535 iohexol (OMNIPAQUE) 350 MG/ML injection (SINGLE-DOSE) 100 mL 100 mL Intravenous Given     01/09/2025 1629 heparin (porcine) injection 5,600 Units 5,600 Units Intravenous Given     01/09/2025 1631 heparin (porcine) 25,000 units in 0.45% NaCl 250 mL infusion (premix) 18 Units/kg/hr Intravenous New Bag     01/10/2025 0147 heparin (porcine) 25,000 units in 0.45% NaCl 250 mL infusion (premix) 15 Units/kg/hr Intravenous Rate/Dose Change     01/10/2025 0934 heparin (porcine) 25,000 units in 0.45% NaCl 250 mL infusion (premix) 12 Units/kg/hr Intravenous Rate/Dose Change     01/10/2025 0841 losartan (COZAAR) tablet 50 mg 50 mg Oral Given     01/10/2025 0841 hydroCHLOROthiazide tablet 12.5 mg 12.5 mg Oral Given     01/10/2025 0556 levothyroxine tablet 75 mcg 75 mcg Oral Given     01/10/2025 0802 magnesium sulfate 2 g/50 mL IVPB (premix) 2 g 2 g Intravenous New Bag            Scheduled Medications:  losartan, 50 mg, Oral, Daily   And  hydroCHLOROthiazide, 12.5 mg, Oral, Daily  levothyroxine, 75 mcg, Oral, Early Morning      Continuous IV Infusions:  heparin (porcine), 3-30 Units/kg/hr (Order-Specific), Intravenous, Titrated      PRN Meds:  acetaminophen, 650 mg, Oral, Q6H PRN  heparin (porcine), 2,800 Units, Intravenous, Q6H PRN  heparin (porcine), 5,600 Units, Intravenous, Q6H PRN      ED Triage Vitals   Temperature Pulse Respirations Blood Pressure SpO2 Pain Score   01/09/25 1311 01/09/25 1311 01/09/25 1311 01/09/25 1311 01/09/25 1311 01/10/25 0715   98.4 °F (36.9 °C) 87 20 130/60 (S) (!) 88 % No Pain     Weight (last 2 days)       Date/Time Weight    01/09/25 1606 72.4 (159.61)            Vital Signs (last 3 days)       Date/Time Temp  Pulse Resp BP MAP (mmHg) SpO2 Calculated FIO2 (%) - Nasal Cannula Nasal Cannula O2 Flow Rate (L/min) O2 Device Patient Position - Orthostatic VS [place] Coma Scale Score Pain    01/10/25 0945 -- 58 15 -- -- 93 % -- -- -- -- -- --    01/10/25 0800 -- 57 18 168/72 104 93 % -- -- -- -- -- --    01/10/25 0745 -- 54 18 -- -- 93 % -- -- -- -- -- --    01/10/25 0715 -- 56 17 -- -- 90 % -- -- -- -- -- No Pain    01/10/25 0700 -- 59 18 -- -- 94 % -- -- -- -- -- --    01/10/25 0530 -- 56 16 -- -- 96 % 28 2 L/min Nasal cannula -- -- --    01/10/25 0245 -- 68 16 -- -- 94 % -- -- -- -- -- --    01/10/25 0230 -- 61 19 -- -- 85 % -- -- None (Room air) -- -- --    01/10/25 0145 -- 65 18 133/61 -- 90 % -- -- None (Room air) Lying -- --    01/10/25 0000 -- 79 18 -- -- 91 % -- -- None (Room air) -- -- --    01/09/25 2100 -- 86 18 -- -- 92 % 28 2 L/min Nasal cannula -- -- --    01/09/25 2045 -- 87 18 -- -- 82 %  -- -- None (Room air) -- -- --    01/09/25 2000 -- 62 19 -- -- 90 % -- -- -- -- -- --    01/09/25 1815 -- 68 18 -- -- 94 % -- -- -- -- -- --    01/09/25 1730 -- 70 21 -- -- 91 % -- -- None (Room air) -- -- --    01/09/25 1628 -- -- -- -- -- -- -- -- -- -- 15 --    01/09/25 1500 -- 69 18 -- -- 99 % -- -- -- -- -- --    01/09/25 1315 -- 83 19 130/60 87 90 % 28 2 L/min Nasal cannula -- -- --    01/09/25 1311 98.4 °F (36.9 °C) 87 20 130/60 -- 88 %  -- -- None (Room air) Sitting -- --              Pertinent Labs/Diagnostic Test Results:   Radiology:  CTA chest pe study   Final Interpretation by Max Martínez MD (01/09 1514)      Images degraded by respiratory motion artifact.      Acute pulmonary emboli involving predominantly the right middle and right lower lobe pulmonary artery branches, with lesser involvement also likely present of right upper lobe pulmonary artery branches. No saddle embolus is identified. No definite left    pulmonary emboli are identified.      Measured RV/LV ratio is within normal limits at less than 0.9.       Ventricles are normal in size. Likely concentric left ventricular hypertrophy. Mild left atrial enlargement.      Bibasilar lower lobe atelectasis. No consolidation pleural effusion or pneumothorax identified.         I personally discussed this study with AVRIL GARCIA on 1/9/2025 3:49 PM.            Workstation performed: [id number]         XR chest 2 views   Final Interpretation by Andrew Bellamy MD (01/09 1453)      Hypoventilation with prominence of the interstitial markings and atelectasis at the bases. Small infiltrates are less likely.      Some minimal pulmonary vascular congestion may be present.      Follow-up may be useful.            Resident: Nico Quevedo I, the attending radiologist, have reviewed the images and agree with the final report above.      Workstation performed: [id number]           Cardiology:  ECG 12 lead    by Interface, Ris Results In (01/09 1323)        Results from last 7 days   Lab Units 01/09/25  1322   SARS-COV-2  Negative     Results from last 7 days   Lab Units 01/10/25  0521 01/09/25  1322   WBC Thousand/uL 8.75 10.06   HEMOGLOBIN g/dL 11.2* 13.3   HEMATOCRIT % 34.9 42.5   PLATELETS Thousands/uL 195 208   TOTAL NEUT ABS Thousands/µL  --  6.65         Results from last 7 days   Lab Units 01/10/25  0521 01/09/25  1322   SODIUM mmol/L 140 140   POTASSIUM mmol/L 3.7 3.5   CHLORIDE mmol/L 107 102   CO2 mmol/L 27 29   ANION GAP mmol/L 6 9   BUN mg/dL 28* 31*   CREATININE mg/dL 1.06 1.20   EGFR ml/min/1.73sq m 49 42   CALCIUM mg/dL 11.8* 12.8*   MAGNESIUM mg/dL 1.6*  --      Results from last 7 days   Lab Units 01/09/25  1322   AST U/L 21   ALT U/L 25   ALK PHOS U/L 70   TOTAL PROTEIN g/dL 8.0   ALBUMIN g/dL 4.4   TOTAL BILIRUBIN mg/dL 0.50         Results from last 7 days   Lab Units 01/10/25  0521 01/09/25  1322   GLUCOSE RANDOM mg/dL 100 170*     Results from last 7 days   Lab Units 01/09/25  1322   HS TNI 0HR ng/L 4         Results from last 7 days   Lab  Units 01/10/25  0741 01/09/25  2341 01/09/25  1605   PROTIME seconds  --   --  14.9   INR   --   --  1.13   PTT seconds 173* >210* 28         Results from last 7 days   Lab Units 01/09/25  1322   PROCALCITONIN ng/ml <0.05     Results from last 7 days   Lab Units 01/09/25  1322   BNP pg/mL 128*     Results from last 7 days   Lab Units 01/09/25  1322   INFLUENZA A PCR  Negative   INFLUENZA B PCR  Negative   RSV PCR  Negative       Past Medical History:   Diagnosis Date    Disease of thyroid gland     Hypertension      Present on Admission:  **None**      Admitting Diagnosis: Low oxygen saturation [R79.81]  Age/Sex: 79 y.o. female    Network Utilization Review Department  ATTENTION: Please call with any questions or concerns to [phone] and carefully listen to the prompts so that you are directed to the right person. All voicemails are confidential.   For Discharge needs, contact Care Management DC Support Team at [phone] opt. 2  Send all requests for admission clinical reviews, approved or denied determinations and any other requests to dedicated fax number below belonging to the campus where the patient is receiving treatment. List of dedicated fax numbers for the Facilities:  FACILITY NAME UR FAX NUMBER   ADMISSION DENIALS (Administrative/Medical Necessity) [phone]   DISCHARGE SUPPORT TEAM (NETWORK) [phone]   PARENT CHILD HEALTH (Maternity/NICU/Pediatrics) [phone]   [facility] [phone]   [facility] [phone]   [facility] [phone]   [facility] [phone]   [facility] [phone]   [facility] [phone]   [facility] [phone]   [facility] [phone]   [facility] [phone]   [facility]  [phone]   [facility] [phone]   [facility] [phone]

## 2025-01-10 NOTE — ED NOTES
2 nurse Verification for Hold on heparin with primary nurse.      Lainey Espinoza RN  01/10/25 0048       Lainey Espinoza RN  01/10/25 0049

## 2025-01-10 NOTE — ASSESSMENT & PLAN NOTE
R lung pulmonary emboli found on CT PE study due to hypoxia, cough  Has a sedentary lifestyle  Has had a DVT in her LE several years ago, but not currently on any AC  Hemodynamically stable at this time.    Plan:  Continue heparin gtt for now  Eliquis has been sent for vizcaino check  Will likely be able to d/c home tomorrow as long as able to wean off oxygen

## 2025-01-10 NOTE — DISCHARGE SUMMARY
Discharge Summary - Hospitalist   Name: Donna Amado 79 y.o. female I MRN: [id number]  Unit/Bed#: ED 05 I Date of Admission: 1/9/2025   Date of Service: 1/10/2025 I Hospital Day: 0     Assessment & Plan  Multiple subsegmental pulmonary emboli without acute cor pulmonale (HCC)  R lung pulmonary emboli found on CT PE study due to hypoxia, cough  Has a sedentary lifestyle  Has had a DVT in her LE several years ago, but not currently on any AC  Hemodynamically stable  Requiring oxygen, meeting requirements for supplemental home oxygen    Plan:  DC with home oxygen  Lovenox 60 mg twice daily with good Rx coupon due to Eliquis being too expensive  Follow-up with family doctor    Acquired hypothyroidism  Continue home euthyrox 75mcg  Primary hypertension  Continue irbesartan/HCTZ combo pill  Acute hypoxic respiratory failure (HCC)  Was requiring 3L O2 on admission due to hypoxia to 86%.  2/2 to PE  Did have recent viral illness about a week ago  Per RT eval, 1 L at rest, 2.5 with exertion     Medical Problems      Discharging Physician / Practitioner: Danielle Linda DO  PCP: No primary care provider on file.  Admission Date:   Admission Orders (From admission, onward)       Ordered        01/09/25 1623  Place in Observation  Once                          Discharge Date: 01/10/25    Consultations During Hospital Stay:  Case management, RT    Procedures Performed:   Home O2 eval    Significant Findings / Test Results:   PE  Outpatient Tests Requested:  Follow-up with primary care    Complications: None    Reason for Admission: cough and upper back pain    Hospital Course:   Donna Amado is a 79 y.o. female patient who originally presented to the hospital on 1/9/2025 due to cough and upper back pain.  Found to have multiple PEs on the right lung, started on a heparin drip and maintained on supplemental oxygen.  Patient was hemodynamically stable, however required home O2 on discharge likely will be temporary.   "Given prescription for Lovenox injections as Eliquis was too expensive.  She has been given the number for [facility] for follow-up.    Please see above list of diagnoses and related plan for additional information.     Condition at Discharge: good    Discharge Day Visit / Exam:   Subjective: Patient is doing well and denies any shortness of breath.  She says her cough is better and her upper back pain is also improved.  Vitals: Blood Pressure: 168/72 (01/10/25 0800)  Pulse: 58 (01/10/25 0945)  Temperature: 98.4 °F (36.9 °C) (01/09/25 1311)  Temp Source: Oral (01/09/25 1311)  Respirations: 15 (01/10/25 0945)  Height: 4' 9\" (144.8 cm) (01/10/25 0715)  Weight - Scale: 72.4 kg (159 lb 9.8 oz) (01/09/25 1606)  SpO2: 93 % (01/10/25 0945)  Physical Exam  Vitals and nursing note reviewed.   Constitutional:       General: She is not in acute distress.     Appearance: She is well-developed.   HENT:      Head: Normocephalic and atraumatic.   Eyes:      Extraocular Movements: Extraocular movements intact.   Cardiovascular:      Rate and Rhythm: Normal rate and regular rhythm.      Heart sounds: No murmur heard.  Pulmonary:      Effort: Pulmonary effort is normal. No respiratory distress.      Breath sounds: Normal breath sounds.   Abdominal:      General: Abdomen is flat. Bowel sounds are normal. There is no distension.      Palpations: Abdomen is soft.      Tenderness: There is no abdominal tenderness.   Musculoskeletal:         General: No swelling.      Cervical back: Neck supple.      Right lower leg: No edema.      Left lower leg: No edema.   Skin:     General: Skin is warm and dry.      Capillary Refill: Capillary refill takes less than 2 seconds.   Neurological:      Mental Status: She is alert.   Psychiatric:         Mood and Affect: Mood normal.          Discussion with Family: Updated  (daughter) at bedside.  Used  alexei    Discharge instructions/Information to patient and family:   See after " visit summary for information provided to patient and family.      Provisions for Follow-Up Care:  See after visit summary for information related to follow-up care and any pertinent home health orders.      Mobility at time of Discharge:      HLM Goal NOT achieved. Continue to encourage mobility in post discharge setting.     Disposition:   Home    Planned Readmission: No    Discharge Medications:  See after visit summary for reconciled discharge medications provided to patient and/or family.      Administrative Statements   Discharge Statement:  I have spent a total time of 45 minutes in caring for this patient on the day of the visit/encounter. .    **Please Note: This note may have been constructed using a voice recognition system**

## 2025-01-10 NOTE — DISCHARGE INSTR - AVS FIRST PAGE
"Por favor, tome inyecciones de lovenox dos veces al gay. Para ayudar a pagar mekhi medicamento, puede usar el sitio web \"GoodRx\" que tiene cupones.     He incluido un consultorio medico que puede arleen pacientes sin seguro, tu puedes ir a esta oficina para tu atencion medica.    Si tiene algun signo de sangrado o provlema con clayton medicacion, vuelva al hospital o llame a clayton medico. Si aun no tiene un medico, vuelva al hospital  "

## 2025-01-10 NOTE — ED NOTES
PIV removed, instructions reviewed with patient and daughter at bedside. Good RX card provided. Oxygen set up sent home with patient. Daughter verbalized understanding. Patient discharged via WC with oxygen, daughter will drive her home.      Wanda Batista RN  01/10/25 5311

## 2025-01-10 NOTE — ASSESSMENT & PLAN NOTE
Was requiring 3L O2 on admission due to hypoxia to 86%.  2/2 to PE  Did have recent viral illness about a week ago    Will try to wean as able. During my visit was saturating well on 1.5L

## 2025-01-10 NOTE — RESPIRATORY THERAPY NOTE
Home Oxygen Qualifying Test     Patient name: Donna Amado        : 1945   Date of Test:  January 10, 2025  Diagnosis:    Home Oxygen Test:    **Medicare Guidelines require item(s) 1-5 on all ambulatory patients or 1 and 2 on non-ambulatory patients.    1. Baseline SPO2 on Room Air at rest 88 %   If <= 88% on Room Air add O2 via NC to obtain SpO2 >=88%. If LPM needed, document LPM  needed to reach =>88%    SPO2 during exertion on Room Air NA  %  During exertion monitor SPO2. If SPO2 increases >=89%, do not add supplemental oxygen    SPO2 on Oxygen at Rest 90 % at 1 LPM    SPO2 during exertion on Oxygen 91 % at 2.5 LPM    Test performed during exertion activity.  Ambulation     [x]  Supplemental Home Oxygen is indicated.    []  Client does not qualify for home oxygen.    Respiratory Additional Notes-     Patient was seen today for a Home Oxygen Qualifying Test.  Testing was completed in the ED in her room.  Testing procedure was explained to patient and family via language interpretor.  Patient was received on nasal cannula at 2L and titrated to room air over 10 minutes.  During this time she desaturated to 88% and supplemental oxygen was started at 1/2 L and titrated to 1L to obtain saturations of 90%.  Testing was started on 1L.  Patient was able to ambulate the full 6 minutes without stopping.  During ambulation she desaturated and once again was titrated up to 2.5L to maintain 91%.  Patient returned to bed without incident.    Recommendation:  Supplemental oxygen:  1 liter at rest                                                                         2.5L with exertion.      Norberto Jeffers RT

## 2025-01-10 NOTE — ASSESSMENT & PLAN NOTE
R lung pulmonary emboli found on CT PE study due to hypoxia, cough  Has a sedentary lifestyle  Has had a DVT in her LE several years ago, but not currently on any AC  Hemodynamically stable  Requiring oxygen, meeting requirements for supplemental home oxygen    Plan:  DC with home oxygen  Lovenox 60 mg twice daily with good Rx coupon due to Eliquis being too expensive  Follow-up with family doctor

## 2025-01-10 NOTE — ED NOTES
Pt fell asleep and oxygen sat was 85% on RA. Pt placed on 2L NC.     Katelyn Castorena RN  01/10/25 [id number]

## 2025-01-23 LAB
ATRIAL RATE: 77 BPM
P AXIS: 46 DEGREES
PR INTERVAL: 180 MS
QRS AXIS: 13 DEGREES
QRSD INTERVAL: 100 MS
QT INTERVAL: 396 MS
QTC INTERVAL: 448 MS
T WAVE AXIS: 27 DEGREES
VENTRICULAR RATE: 77 BPM

## 2025-02-05 ENCOUNTER — OFFICE VISIT (OUTPATIENT)
Dept: PULMONOLOGY | Facility: CLINIC | Age: 80
End: 2025-02-05

## 2025-02-05 VITALS
BODY MASS INDEX: 35.38 KG/M2 | TEMPERATURE: 97.3 F | HEART RATE: 66 BPM | WEIGHT: 164 LBS | DIASTOLIC BLOOD PRESSURE: 68 MMHG | SYSTOLIC BLOOD PRESSURE: 106 MMHG | HEIGHT: 57 IN | OXYGEN SATURATION: 97 %

## 2025-02-05 DIAGNOSIS — I26.94 MULTIPLE SUBSEGMENTAL PULMONARY EMBOLI WITHOUT ACUTE COR PULMONALE (HCC): Primary | ICD-10-CM

## 2025-02-05 DIAGNOSIS — J96.01 ACUTE HYPOXIC RESPIRATORY FAILURE (HCC): ICD-10-CM

## 2025-02-05 PROCEDURE — 99215 OFFICE O/P EST HI 40 MIN: CPT | Performed by: INTERNAL MEDICINE

## 2025-02-05 NOTE — PROGRESS NOTES
Name: Donna Amado      : 1945      MRN: [id number]  Encounter Provider: Nolan Maciel MD  Encounter Date: 2025   Encounter department: [facility] [place]  :  Assessment & Plan  Multiple subsegmental pulmonary emboli without acute cor pulmonale (HCC)  Mrs.Transito Amado was recently admitted to [facility] with  acute pulmonary embolism.  She was discharged home on Lovenox injections.  She also needed 2 L/min of oxygen supplementation on discharge.  She has been tolerating systemic anticoagulation well though she had nasal bleeding on 1 occasion.  She reportedly had a clot in her left lower extremity many years back.  She reportedly has not been very active.  She has not had a procoagulant workup.  On clinical examination, her chest was clear to auscultation.  She did not have any significant edema.  She needs to continue with systemic anticoagulation, for her PE, likely indefinitely in view of her recurrent clots and limited mobility.  She should continue with Lovenox injections for now.  This can be transitioned to oral anticoagulant medication depending on her insurance.  Currently she does not have any insurance and her daughter is trying to get Medicaid. I had a long discussion with the patient as well as  her daughter who speaks English.  The patient has hearing problems. I also used .  I have advised her to follow-up with her primary doctor. She said she is going to see  tomorrow for further prescriptions.  Acute hypoxic respiratory failure (HCC)  She was discharged home after her recent hospitalization, on 2 L/min of oxygen supplementation following her acute PE.  Today, her oxygen saturations at rest as well as on 6-minute walk we did subsequently in the office, were above 90%.  She no more requires oxygen supplementation and this can be continued.  I have placed an order to discontinue oxygen therapy and collect all  equipment.  I had a long discussion with the patient as well as her daughter who accompanied her who speaks English and answered all their questions.  Orders:    Discontinue home oxygen        History of Present Illness   [facility]    #[id number] [place] Interpretor Tamara Amado is a 79 y.o. [place]-speaking female with past medical history of hypertension and hypothyroidism who was recently admitted to [facility] with acute multisegmental pulmonary embolism with no RV strain.  She was treated with heparin and was discharged home on Lovenox injections.  She was also found to need 2 L/min of oxygen supplementation which she has been using.  She reportedly had clot in her left leg many years back.  She had some nasal bleeding which settled on its own.  She saw Dr. Priscilla Reynaga from [facility] few days back.  She has been referred regarding the need for long-term systemic anticoagulation therapy.  She denied any significant shortness of breath.  Currently does not have any insurance.  She is from [facility].  Her daughter is trying to get her insurance through Medicaid.  Her appetite has been good.  She is not very active according to the daughter.  In view of recurrent clotting and poor mobility she would likely need lifelong anticoagulation.  She has not had any procoagulant workup.  Our plan is to continue her on systemic anticoagulation for 3 to 6 months and reevaluate.   We  recommend changing her to oral medications if possible from her insurance point of view.  She is going to follow-up with Dr. Reynaga tomorrow.      Review of Systems   Constitutional:  Negative for activity change, appetite change, chills, fatigue and fever.   HENT:  Positive for hearing loss. Negative for rhinorrhea, sore throat and trouble swallowing.    Respiratory:  Negative for cough, chest tightness, shortness of breath and wheezing.    Cardiovascular:  Negative for chest pain, palpitations and leg swelling.  "  Gastrointestinal:  Negative for abdominal pain, constipation, diarrhea, nausea and vomiting.   Genitourinary:  Negative for dysuria, frequency and urgency.   Musculoskeletal:  Positive for back pain. Negative for gait problem.   Skin:  Negative for rash.   Neurological:  Negative for dizziness, syncope, light-headedness and headaches.   Psychiatric/Behavioral:  Negative for agitation, confusion and sleep disturbance. The patient is nervous/anxious.           Objective   /68 (BP Location: Left arm, Patient Position: Sitting, Cuff Size: Standard)   Pulse 66   Temp (!) 97.3 °F (36.3 °C) (Tympanic)   Ht 4' 9\" (1.448 m)   Wt 74.4 kg (164 lb)   SpO2 97%   BMI 35.49 kg/m²      Physical Exam  Vitals reviewed.   Constitutional:       General: She is not in acute distress.     Appearance: She is obese. She is not ill-appearing, toxic-appearing or diaphoretic.   HENT:      Head: Normocephalic.      Mouth/Throat:      Mouth: Mucous membranes are moist.   Eyes:      General: No scleral icterus.     Conjunctiva/sclera: Conjunctivae normal.   Cardiovascular:      Rate and Rhythm: Normal rate and regular rhythm.      Heart sounds: Normal heart sounds. No murmur heard.  Pulmonary:      Effort: Pulmonary effort is normal. No respiratory distress.      Breath sounds: No stridor. No wheezing, rhonchi or rales.   Chest:      Chest wall: No tenderness.   Abdominal:      General: Bowel sounds are normal.      Palpations: Abdomen is soft.      Tenderness: There is no guarding.   Musculoskeletal:      Cervical back: Neck supple. No rigidity.      Right lower leg: No edema.      Left lower leg: No edema.   Lymphadenopathy:      Cervical: No cervical adenopathy.   Skin:     Coloration: Skin is not jaundiced or pale.      Findings: No rash.   Neurological:      Mental Status: She is alert.      Gait: Gait normal.   Psychiatric:         Mood and Affect: Mood normal.         Behavior: Behavior normal.     I spent 45 minutes of time " take care of this patient with complex medical issues who is coming to see me for the first time.  The majority of this time was spent directly with the patient counseling as well as coordinating care.

## 2025-02-05 NOTE — PROGRESS NOTES
Ambulatory referral placed for social work care management.  Patient with pulmonary embolism currently on treatment with Lovenox.  Currently she does not have insurance.

## 2025-02-05 NOTE — ASSESSMENT & PLAN NOTE
She was discharged home after her recent hospitalization, on 2 L/min of oxygen supplementation following her acute PE.  Today, her oxygen saturations at rest as well as on 6-minute walk we did subsequently in the office, were above 90%.  She no more requires oxygen supplementation and this can be continued.  I have placed an order to discontinue oxygen therapy and collect all equipment.  I had a long discussion with the patient as well as her daughter who accompanied her who speaks English and answered all their questions.  Orders:    Discontinue home oxygen

## 2025-02-05 NOTE — ASSESSMENT & PLAN NOTE
Mrs.Transito Amado was recently admitted to [facility] with  acute pulmonary embolism.  She was discharged home on Lovenox injections.  She also needed 2 L/min of oxygen supplementation on discharge.  She has been tolerating systemic anticoagulation well though she had nasal bleeding on 1 occasion.  She reportedly had a clot in her left lower extremity many years back.  She reportedly has not been very active.  She has not had a procoagulant workup.  On clinical examination, her chest was clear to auscultation.  She did not have any significant edema.  She needs to continue with systemic anticoagulation, for her PE, likely indefinitely in view of her recurrent clots and limited mobility.  She should continue with Lovenox injections for now.  This can be transitioned to oral anticoagulant medication depending on her insurance.  Currently she does not have any insurance and her daughter is trying to get Medicaid. I had a long discussion with the patient as well as  her daughter who speaks English.  The patient has hearing problems. I also used .  I have advised her to follow-up with her primary doctor. She said she is going to see  tomorrow for further prescriptions.